# Patient Record
Sex: MALE | Race: BLACK OR AFRICAN AMERICAN | NOT HISPANIC OR LATINO
[De-identification: names, ages, dates, MRNs, and addresses within clinical notes are randomized per-mention and may not be internally consistent; named-entity substitution may affect disease eponyms.]

---

## 2021-04-06 PROBLEM — Z00.00 ENCOUNTER FOR PREVENTIVE HEALTH EXAMINATION: Status: ACTIVE | Noted: 2021-04-06

## 2021-04-08 ENCOUNTER — APPOINTMENT (OUTPATIENT)
Dept: OTOLARYNGOLOGY | Facility: CLINIC | Age: 21
End: 2021-04-08
Payer: COMMERCIAL

## 2021-04-08 ENCOUNTER — NON-APPOINTMENT (OUTPATIENT)
Age: 21
End: 2021-04-08

## 2021-04-08 DIAGNOSIS — Q99.9 CHROMOSOMAL ABNORMALITY, UNSPECIFIED: ICD-10-CM

## 2021-04-08 DIAGNOSIS — Z78.9 OTHER SPECIFIED HEALTH STATUS: ICD-10-CM

## 2021-04-08 DIAGNOSIS — F32.9 MAJOR DEPRESSIVE DISORDER, SINGLE EPISODE, UNSPECIFIED: ICD-10-CM

## 2021-04-08 PROCEDURE — 99204 OFFICE O/P NEW MOD 45 MIN: CPT | Mod: 25

## 2021-04-08 PROCEDURE — 31231 NASAL ENDOSCOPY DX: CPT

## 2021-04-08 PROCEDURE — 99072 ADDL SUPL MATRL&STAF TM PHE: CPT

## 2021-04-08 RX ORDER — LORATADINE 10 MG
TABLET,CHEWABLE ORAL
Refills: 0 | Status: ACTIVE | COMMUNITY

## 2021-04-08 RX ORDER — DOXYCYCLINE HYCLATE 100 MG/1
100 CAPSULE ORAL
Qty: 42 | Refills: 0 | Status: ACTIVE | COMMUNITY
Start: 2021-04-08 | End: 1900-01-01

## 2021-04-08 RX ORDER — FLUTICASONE PROPIONATE 50 UG/1
50 SPRAY, METERED NASAL DAILY
Qty: 1 | Refills: 7 | Status: ACTIVE | COMMUNITY
Start: 2021-04-08 | End: 1900-01-01

## 2021-04-08 RX ORDER — LEVOCETIRIZINE DIHYDROCHLORIDE 5 MG/1
5 TABLET ORAL DAILY
Qty: 30 | Refills: 2 | Status: ACTIVE | COMMUNITY
Start: 2021-04-08 | End: 1900-01-01

## 2021-04-08 NOTE — HISTORY OF PRESENT ILLNESS
[de-identified] : Patient presents today c/o sinuitis. Patient has severe allergies and uses Claritin.  Was prescribed 2 nasal sprays which when he was taking helped his symptoms. \par Has a history of sinus infections.  most recent infection was last week , usually takes care of it on his own.  He get lightheadedness , sinus pressure , nasal congestion , yellow mucus  when he has a sinus infection. Constant PND. Pt has 5-6 sinus infections per and last one was 2 weeks ago. Pt usually works through them.

## 2021-05-13 ENCOUNTER — OUTPATIENT (OUTPATIENT)
Dept: OUTPATIENT SERVICES | Facility: HOSPITAL | Age: 21
LOS: 1 days | Discharge: HOME | End: 2021-05-13
Payer: COMMERCIAL

## 2021-05-13 ENCOUNTER — RESULT REVIEW (OUTPATIENT)
Age: 21
End: 2021-05-13

## 2021-05-13 DIAGNOSIS — J32.9 CHRONIC SINUSITIS, UNSPECIFIED: ICD-10-CM

## 2021-05-13 PROCEDURE — 70486 CT MAXILLOFACIAL W/O DYE: CPT | Mod: 26

## 2021-05-24 ENCOUNTER — APPOINTMENT (OUTPATIENT)
Dept: OTOLARYNGOLOGY | Facility: CLINIC | Age: 21
End: 2021-05-24
Payer: COMMERCIAL

## 2021-05-24 DIAGNOSIS — J30.9 ALLERGIC RHINITIS, UNSPECIFIED: ICD-10-CM

## 2021-05-24 DIAGNOSIS — J45.909 UNSPECIFIED ASTHMA, UNCOMPLICATED: ICD-10-CM

## 2021-05-24 DIAGNOSIS — J32.9 CHRONIC SINUSITIS, UNSPECIFIED: ICD-10-CM

## 2021-05-24 DIAGNOSIS — R09.81 NASAL CONGESTION: ICD-10-CM

## 2021-05-24 PROCEDURE — 99213 OFFICE O/P EST LOW 20 MIN: CPT | Mod: 25

## 2021-05-24 PROCEDURE — 31231 NASAL ENDOSCOPY DX: CPT

## 2021-05-24 RX ORDER — MONTELUKAST 10 MG/1
10 TABLET, FILM COATED ORAL DAILY
Qty: 30 | Refills: 6 | Status: ACTIVE | COMMUNITY
Start: 2021-05-24 | End: 1900-01-01

## 2021-05-24 NOTE — HISTORY OF PRESENT ILLNESS
[FreeTextEntry1] : Patient following up on chronic sinusitis, allergic rhinitis. Patient admits he finished antibiotics. He denies any recent sinus infection. Suffering with allergy symptoms. Patient sent for CT scan; here to discuss results.

## 2021-05-24 NOTE — REASON FOR VISIT
[Subsequent Evaluation] : a subsequent evaluation for [FreeTextEntry2] : chronic sinusitis, allergic rhinitis

## 2021-05-24 NOTE — PROCEDURE
[Recalcitrant Symptoms] : recalcitrant symptoms  [None] : none [Flexible Endoscope] : examined with the flexible endoscope [Congested] : congested [Allergic] : allergic signs [Corine] : corine [Normal] : the paranasal sinuses had no abnormalities

## 2021-05-24 NOTE — DATA REVIEWED
[de-identified] : EXAM: CT MAXILLOFACIAL\par \par \par PROCEDURE DATE: 05/13/2021\par \par \par \par \par INTERPRETATION: CT EXAMINATION OF THE PARANASAL SINUSES\par \par CLINICAL INDICATION: Chronic sinusitis\par \par TECHNIQUE: CT examination of the paranasal sinuses was performed utilizing the Breezy protocol. These images were used to create axial, coronal and sagittal reformatted images through the paranasal sinuses. No intravenous contrast was administered. The images were reviewed in bone and soft-tissue windows.\par \par COMPARISON: None available\par \par FINDINGS:\par Sinocranial & sinoorbital junctions: The lamina papyracea, cribriform plates and fovea ethmoidalis are intact.\par \par Nasal septum and nasal cavity: There is rightward nasal septal deviation. There is mild scattered inflammatory mucosal thickening in the right nasal cavity with apposition of the middle and inferior nasal turbinates to the nasal septum and lateral nasal wall.\par \par Frontal sinuses, drainage pathways, and associated anatomic variants: The frontal sinuses are well developed. The frontal sinuses and frontal sinus outflow tracts are clear. There is a small interfrontal sinus air cell.\par \par Ethmoid sinuses: There is mild scattered inflammatory mucosal thickening in the bilateral ethmoid sinuses, right more than left. Bilateral Agger nasi and frontal cells are noted.\par \par Sphenoid sinuses, drainage pathways, and associated variants: There is mild scattered inflammatory mucosal thickening in the bilateral ethmoid sinuses which obstruct the bilateral sphenoethmoidal recesses. The carotid canals are covered by bone. The sphenoid sinus septum inserts in the bony covering of the left carotid canal. There is intrasinus course of the bilateral vidian canals.\par \par Maxillary sinuses, drainage pathways, and associated variants: The maxillary sinuses are well developed. There are moderate scattered retention cysts in the bilateral maxillary sinuses including in the region of the alveolar recesses. There are mild protrusions of roots of the maxillary molar to the bilateral alveolar recesses. There is mild scattered inflammatory mucosal thickening along the bilateral maxillary infundibulum.\par \par Other:\par Partially visualized intracranial structures: Normal\par Orbits: Normal\par Mastoid air cells: Normal\par \par \par IMPRESSION:\par \par Rightward nasal septal deviation, and mild scattered inflammatory mucosal thickening the right nasal cavity.\par \par Scattered inflammatory mucosal thickening in the paranasal sinuses and their respective drainage pathways.\par \par Scattered retention cysts in the bilateral maxillary sinuses with mild protrusion of the maxillary molars into the alveolar recesses, raising a possibility of odontogenic sinusitis.\par \par Sinonasal anatomic variations.\par \par \par \par \par BERE FISH M.D., ATTENDING RADIOLOGIST\par This document has been electronically signed. May 14 2021 9:01AM

## 2021-06-12 ENCOUNTER — TRANSCRIPTION ENCOUNTER (OUTPATIENT)
Age: 21
End: 2021-06-12

## 2024-09-08 ENCOUNTER — NON-APPOINTMENT (OUTPATIENT)
Age: 24
End: 2024-09-08